# Patient Record
Sex: FEMALE | Race: WHITE
[De-identification: names, ages, dates, MRNs, and addresses within clinical notes are randomized per-mention and may not be internally consistent; named-entity substitution may affect disease eponyms.]

---

## 2017-11-28 ENCOUNTER — HOSPITAL ENCOUNTER (OUTPATIENT)
Dept: HOSPITAL 10 - OBT | Age: 32
Discharge: HOME | End: 2017-11-28
Attending: OBSTETRICS & GYNECOLOGY
Payer: MEDICAID

## 2017-11-28 VITALS
HEIGHT: 60 IN | BODY MASS INDEX: 31.81 KG/M2 | WEIGHT: 162.04 LBS | HEIGHT: 60 IN | WEIGHT: 162.04 LBS | BODY MASS INDEX: 31.81 KG/M2

## 2017-11-28 VITALS — HEART RATE: 91 BPM | RESPIRATION RATE: 19 BRPM | DIASTOLIC BLOOD PRESSURE: 77 MMHG | SYSTOLIC BLOOD PRESSURE: 118 MMHG

## 2017-11-28 DIAGNOSIS — Z3A.37: ICD-10-CM

## 2017-11-28 PROCEDURE — 76818 FETAL BIOPHYS PROFILE W/NST: CPT

## 2017-11-28 PROCEDURE — G0463 HOSPITAL OUTPT CLINIC VISIT: HCPCS

## 2017-11-28 NOTE — TRIAGE
===================================

OB Triage

===================================

Datetime Report Generated by CPN: 11/28/2017 15:28

   

   

===========================

Datetime: 11/28/2017 15:17

===========================

   

 Stage of Pregnancy:  OB Triage

   

===================================

Maternal Assessment

===================================

   

 Level of Consciousness:  Fully Conscious

 DTR's/Clonus:  DTRs 1+

 Headache:  Denies

 Breath Sounds, Left:  Clear and Equal

 Breath Sounds, Right:  Clear and Equal

 Nausea/Vomiting:  Denies

 RUQ Epigastric Pain:  Denies

 Monitor Mode:  External

 Resting Tone [place]:  Relaxed

   

===================================

Fetal Heart Rate

===================================

   

 FHR Baseline Rate:  135

 Monitor Mode:  External US

 Variability:  Moderate 6-25 bpm

 Accelerations:  15X15

 Decelerations:  None

 Category:  Category I

   

===================================

Pain Assessment

===================================

   

 Pain Scale:  0

 Pain Presence:  None/Denies

 Pain Type:  N/A

 Pain Goal:  3

   

===================================

Vaginal Exam

===================================

   

 Membrane Status:  Intact

   

===========================

Datetime: 11/28/2017 15:00

===========================

   

 Stage of Pregnancy:  OB Triage

   

===================================

Maternal Assessment

===================================

   

 Level of Consciousness:  Fully Conscious

 DTR's/Clonus:  DTRs 1+

 Headache:  Denies

 Breath Sounds, Left:  Clear and Equal

 Breath Sounds, Right:  Clear and Equal

 Nausea/Vomiting:  Denies

 RUQ Epigastric Pain:  Denies

   

===================================

Labor Evaluation

===================================

   

 Frequency:  X1

 Monitor Mode:  External

 Duration (sec)2399:  60

      

 Quality:  Mild

 Resting Tone [place]:  Relaxed

   

===================================

Fetal Heart Rate

===================================

   

 FHR Baseline Rate:  135

 Monitor Mode:  External US

 Variability:  Moderate 6-25 bpm

 Accelerations:  15X15

 Decelerations:  None

 Category:  Category I

   

===================================

Pain Assessment

===================================

   

 Pain Scale:  0

 Pain Presence:  None/Denies

 Pain Type:  N/A

 Pain Goal:  3

   

===================================

Vaginal Exam

===================================

   

 Membrane Status:  Intact

   

===========================

Datetime: 11/28/2017 14:10

===========================

   

 Stage of Pregnancy:  OB Triage

   

===================================

Maternal Assessment

===================================

   

 Level of Consciousness:  Fully Conscious

 DTR's/Clonus:  DTRs 1+

 Headache:  Denies

 Breath Sounds, Left:  Clear and Equal

 Breath Sounds, Right:  Clear and Equal

 Nausea/Vomiting:  Denies

 RUQ Epigastric Pain:  Denies

   

===================================

Labor Evaluation

===================================

   

 Frequency:  NONE

 Monitor Mode:  External

 Resting Tone [place]:  Relaxed

   

===================================

Fetal Heart Rate

===================================

   

 FHR Baseline Rate:  140

 Monitor Mode:  External US

 Variability:  Moderate 6-25 bpm

 Accelerations:  10X10

 Decelerations:  None

 Category:  Category II

   

===================================

Pain Assessment

===================================

   

 Pain Scale:  0

 Pain Presence:  None/Denies

 Pain Type:  N/A

 Pain Goal:  3

   

===================================

Vaginal Exam

===================================

   

 Membrane Status:  Intact

   

===========================

Datetime: 11/28/2017 13:35

===========================

   

   

===================================

Maternal Assessment

===================================

   

 Level of Consciousness:  Fully Conscious

 DTR's/Clonus:  DTRs 1+

 Headache:  Denies

 Blurred Vision:  No

 Respiratory Effort:  Unlabored

 Breath Sounds, Left:  Clear and Equal

 Breath Sounds, Right:  Clear and Equal

 Nausea/Vomiting:  Denies

 RUQ Epigastric Pain:  Denies

 Facial Edema:  None

   

===================================

Labor Evaluation

===================================

   

 Frequency:  NONE

 Monitor Mode:  External

 Resting Tone [place]:  Relaxed

   

===================================

Fetal Heart Rate

===================================

   

 FHR Baseline Rate:  140

 Monitor Mode:  External US

 Variability:  Moderate 6-25 bpm

 Accelerations:  10X10

 Decelerations:  Variable

 Category:  Category I

   

===================================

Pain Assessment

===================================

   

 Pain Scale:  0

 Pain Presence:  None/Denies

 Pain Type:  N/A

 Pain Goal:  3

   

===================================

Vaginal Exam

===================================

   

 Membrane Status:  Intact

   

===========================

Datetime: 11/28/2017 13:10

===========================

   

 Assessment Type:  Triage

   

===================================

Maternal Assessment

===================================

   

 Level of Consciousness:  Fully Conscious

 DTR's/Clonus:  DTRs 2+; No Clonus

 Headache:  Denies

 Blurred Vision:  No

 Respiratory Effort:  Unlabored; Regular Rhythm; Equal Expansion

 Breath Sounds, Left:  Clear and Equal

 Breath Sounds, Right:  Clear and Equal

 Nausea/Vomiting:  Denies

 RUQ Epigastric Pain:  Denies

 Lower Extremities Edema:  None

     Degree:  None

 Upper Extremities Edema:  None

     Degree:  None

 Facial Edema:  None

   

===================================

Fall Risk Assessment

===================================

   

 History of Falling:  (0) No

 Secondary Diagnosis:  (0) No

 Ambulatory Aid:  (0) Bedrest/Nurse Assist

 IV Therapy:  (0) No

 Gait:  (0) Normal/Bedrest/Immobile

 Mental Status:  (0) Oriented to Own Ability

 Fall Score:  0

 Fall Risk Score Definition:  No Risk: No action required

   

===========================

Datetime: 11/28/2017 13:07

===========================

   

 EGA:  37.4

   

===========================

Datetime: 11/28/2017 12:49

===========================

   

 Time of Arrival:  11/28/2017 12:49

 Arrived By:  Ambulatory

 Arrived From:  Home

 Chief Complaint:  PT CAME IN C/O UC'S SINCE YESTERDAY Q 10 MIN

 Fetal Movement:  Present

 Contractions:  Denies/Absent

 Rupture of Membranes:  Denies

 Vaginal Discharge:  Denies

 Recent Sexual Intercouse:  Denies

 Abdominal Trauma:  Not Applicable

 Additional Patient Complaints:  NONE

 Time Provider Notified:  11/28/2017 13:00

 Provider Notified:  RONIT

 Initial Plan:  NST AND BPP AND VE

## 2017-11-28 NOTE — PN
Triage Information


Date/Time





Reason for visit:  Uterine contractions


Weeks of Gestation


37+


/Para


3/1


Diabetes:  none


Hypertention:  none





Objective





 Vital Signs








  Date Time  Temp Pulse Resp B/P Pulse Ox O2 Delivery O2 Flow Rate FiO2


 


17 13:27 98.6 91 19 118/77 99 Room Air  








Fetal Heart Rate:  140's


Contractions:  None


Disposition:  Discharge


Assessment/Plan


Discharged with precautions


F/u with primary JANUSZ FINCH M.D. 2017 15:02

## 2017-11-28 NOTE — RADRPT
PROCEDURE:   US OB biophysical profile. 

 

CLINICAL INDICATION:    decreased fetal movements 

 

TECHNIQUE:   Multiple sonographic images of the pelvis were obtained.  The images were reviewed on a
 PACS workstation. 

 

COMPARISON:   No prior studies are available for comparison. 

 

FINDINGS:

 

There is a single viable intrauterine gestation.  Cardiac activity is present with 156 beats per min
[place]. 

There is a vertex presentation. 

The placenta is fundal left.   There is no evidence of placental abruption.

There is a normal amount of amniotic fluid with an DEBORA = 10.4 cm.

 

Biophysical profile:

Fetal movement 2/2

Fetal tone 2/2.

Fetal breathing 2/2 

DEBORA 2/2

 

Total 8/8 

 

RPTAT: AA . 

 

IMPRESSION:

Normal biophysical profile.  

 .

_____________________________________________ 

.Mario Alberto Don MD, MD           Date    Time 

Electronically viewed and signed by .Mario Alberto Don MD, MD on 11/28/2017 13:50 

 

D:  11/28/2017 13:50  T:  11/28/2017 13:50

.S/

## 2017-11-30 ENCOUNTER — HOSPITAL ENCOUNTER (OUTPATIENT)
Dept: HOSPITAL 10 - OBT | Age: 32
Discharge: HOME | End: 2017-11-30
Attending: OBSTETRICS & GYNECOLOGY
Payer: MEDICAID

## 2017-11-30 VITALS — HEART RATE: 89 BPM | SYSTOLIC BLOOD PRESSURE: 123 MMHG | DIASTOLIC BLOOD PRESSURE: 72 MMHG | RESPIRATION RATE: 18 BRPM

## 2017-11-30 VITALS
WEIGHT: 163.14 LBS | BODY MASS INDEX: 32.03 KG/M2 | HEIGHT: 60 IN | WEIGHT: 163.14 LBS | BODY MASS INDEX: 32.03 KG/M2 | HEIGHT: 60 IN

## 2017-11-30 DIAGNOSIS — O36.8130: Primary | ICD-10-CM

## 2017-11-30 DIAGNOSIS — Z3A.37: ICD-10-CM

## 2017-11-30 PROCEDURE — 76818 FETAL BIOPHYS PROFILE W/NST: CPT

## 2017-11-30 PROCEDURE — G0463 HOSPITAL OUTPT CLINIC VISIT: HCPCS

## 2017-11-30 NOTE — TRIAGE
===================================

OB Triage

===================================

Datetime Report Generated by CPN: 11/30/2017 18:19

   

   

===========================

Datetime: 11/30/2017 17:38

===========================

   

   

===================================

Labor Evaluation

===================================

   

 Frequency:  0

 Monitor Mode:  External

 Pattern:  Normal: <= 5 Contractions in 10 Minutes

 Resting Tone [place]:  Relaxed

   

===================================

Fetal Heart Rate

===================================

   

 FHR Baseline Rate:  140

 Monitor Mode:  External US

 Variability:  Moderate 6-25 bpm

 Accelerations:  15X15

 Decelerations:  None

 Category:  Category I

   

===================================

Pain Assessment

===================================

   

 Pain Presence:  None/Denies

   

===========================

Datetime: 11/30/2017 15:49

===========================

   

   

===================================

Labor Evaluation

===================================

   

 Frequency:  0

 Monitor Mode:  External

 Pattern:  Normal: <= 5 Contractions in 10 Minutes

 Resting Tone [place]:  Relaxed

   

===================================

Fetal Heart Rate

===================================

   

 FHR Baseline Rate:  140

 Monitor Mode:  External US

 Variability:  Moderate 6-25 bpm

 Accelerations:  15X15

 Decelerations:  None

 Category:  Category I

 Comments:  NST REACTIVE

   

===================================

Pain Assessment

===================================

   

 Pain Presence:  None/Denies

   

===========================

Datetime: 11/30/2017 15:17

===========================

   

 Assessment Type:  Admission Assessment

   

===================================

Maternal Assessment

===================================

   

 Level of Consciousness:  Fully Conscious

 DTR's/Clonus:  DTRs 2+; No Clonus

 Headache:  Denies

 Blurred Vision:  No

 Respiratory Effort:  Unlabored; Regular Rhythm; Equal Expansion

 Breath Sounds, Left:  Clear and Equal

 Breath Sounds, Right:  Clear and Equal

 Nausea/Vomiting:  Denies

 RUQ Epigastric Pain:  Denies

 Lower Extremities Edema:  None

     Degree:  None

 Upper Extremities Edema:  None

     Degree:  None

 Facial Edema:  None

   

===================================

Fall Risk Assessment

===================================

   

 History of Falling:  (0) No

 Secondary Diagnosis:  (0) No

 Ambulatory Aid:  (0) Bedrest/Nurse Assist

 IV Therapy:  (0) No

 Gait:  (0) Normal/Bedrest/Immobile

 Mental Status:  (0) Oriented to Own Ability

 Fall Score:  0

 Fall Risk Score Definition:  No Risk: No action required

   

===========================

Datetime: 11/30/2017 15:16

===========================

   

 Time of Arrival:  11/30/2017 14:25

 EGA:  37.6

 Arrived By:  Ambulatory

 Arrived From:  Home

 Chief Complaint:  FOLLOW UP NST/ BPP FOR DECREASED FETAL MOVEMENT

 Fetal Movement:  Decreased

 Contractions:  Denies/Absent

 Time Contractions Began:  11/30/2017 16:10

 Rupture of Membranes:  Denies

 Vaginal Bleeding:  None

 Vaginal Discharge:  Denies

 Recent Sexual Intercouse:  Denies

 Abdominal Trauma:  Not Applicable

 Patient Complaints:  None

 Time Provider Notified:  11/30/2017 16:20

 Provider Notified:  [facility]

 Initial Plan:  TOCO/ US. NST- REACTIVE/ BPP-8/8, DEBORA 14.0

   

===========================

Datetime: 11/30/2017 14:58

===========================

   

   

===================================

Pain Assessment

===================================

   

 Pain Presence:  None/Denies

   

===========================

Datetime: 11/28/2017 13:10

===========================

   

 Fall Score:  0

 Fall Risk Score Definition:  No Risk: No action required

   

===========================

Datetime: 11/28/2017 13:07

===========================

   

 EGA:  37.4

## 2017-11-30 NOTE — CONS
Date/Time of Note


Date/Time of Note


DATE: 17 


TIME: 17:41





Consultation Date/Type/Reason


Admit Date/Time


 2017


OB triage consult.


This patient is a 32 years old  3 para 1 spontaneous  1 living 1

, with estimated date of confinement of December 15, 2017 which makes her 37 

weeks and 6 days today.


She was referred to the triage due to low fetal movement.


On examination she is a close to term pregnancy  patient in no acute 

distress her vital signs appears to be normal with blood pressure 123/72, pulse 

rate of 89 respiration 18,, temperature 98.6 sevenths.


On examination abdomen is soft she has occasional contraction.


Fetal heart tracing is reactive with good variability and occasional 

acceleration no decelerations.


Constitutional:  No chills, No diaphoresis, No disoriented, No febrile, No 

improved, No no complaints, No other, No poor po, No requiring IVF, No 

requiring O2


ENT:  No bleeding, No congestion, No discharge, No dysphagia, No no complaints, 

No other, No pain, No sore throat


Respiratory:  No cough, No no complaints, No other, No pain, No pleuritic pain, 

No shortness of breath, No sputum, No wheezing


Cardiovascular:  No chest pain, No edema, No lightheadedness, No no complaints, 

No orthopenea, No other, No palpitations, No paroxysmal nocturnal dyspnea


Gastrointestinal:  No blood, No constipation, No decreased appetite, No diarrhea

, No flatus, No nausea, No no complaints, No other, No pain, No passing stool, 

No vomiting


Genitourinary:  No bleeding, No discharge, No dysuria, No flank pain, No 

hematuria, No no complaints, No other


Musculoskeletal:  No back pain, No bone/joint pain, No neck pain, No no 

complaints, No other, No restricted range of motion, No swelling


Skin:  No bruising, No erythema, No laceration, No no complaints, No other, No 

pruritis, No rash, No skin lesions


Neurologic:  No confusion, No dizziness, No focal-weakness, No headache, No no 

complaints, No other, No seizure, No syncope


Endocrine:  No dry skin, No no complaints, No other, No polydypsia, No polyuria

, No temp intolerance


Additional Comments


Due to lack of  contractions pelvic exam was not performed.


On ultrasound study report was a single viable gestation in cephalic 

presentation no evidence of placenta previa amniotic fluid index was reported 

at 14.0 cm


Her biophysical profile was 8/8.


;.


Disposition with these normal finding patient was discharged to be returned to 

her obstetrician's clinic for follow-up of care kick count was advised





Social History


Smoking Status:  Never smoker





Exam/Review of Systems


Vital Signs


Vitals





 Vital Signs








  Date Time  Temp Pulse Resp B/P Pulse Ox O2 Delivery O2 Flow Rate FiO2


 


17 15:06 98.6 89 18 123/72  Room Air  

















THALIA MANUEL MD 2017 17:47

## 2017-11-30 NOTE — RADRPT
PROCEDURE:   Obstetrical ultrasound for biophysical profile 

 

CLINICAL INDICATION:  Biophysical profile.  Pregnancy.  

 

TECHNIQUE:   Obstetrical ultrasound of the pregnant uterus for biophysical profile.  Transabdominal 
views are obtained.

 

COMPARISON:   US PELVIS 11/28/2017

 

FINDINGS:

Single intrauterine gestation.

 

Presentation: Cephalic.

 

Placenta: Fundal 

No evidence of placental abruption.

No evidence of placenta previa.

 

Fetal breathing movement = 2/2

Fetal tone = 2/2

Fetal motion = 2/2

DEBORA = 2/2

 

DE[place] = 14.0 cm

Fetal heart rate: 142 beats per minute

 

 

IMPRESSION:

 

Single intrauterine gestation.

Biophysical profile  8/8  

 

 

RPTAT: AADD

_____________________________________________ 

.Isaac Meza MD, MD           Date    Time 

Electronically viewed and signed by .Isaac Meza MD, MD on 11/30/2017 16:17 

 

D:  11/30/2017 16:17  T:  11/30/2017 16:17

.B/

## 2017-12-20 ENCOUNTER — HOSPITAL ENCOUNTER (INPATIENT)
Dept: HOSPITAL 91 - OBT | Age: 32
LOS: 14 days | Discharge: HOME HEALTH | End: 2018-01-03
Payer: MEDICAID

## 2017-12-20 ENCOUNTER — HOSPITAL ENCOUNTER (INPATIENT)
Age: 32
LOS: 14 days | Discharge: HOME HEALTH | End: 2018-01-03

## 2017-12-20 DIAGNOSIS — N99.89: ICD-10-CM

## 2017-12-20 DIAGNOSIS — O36.8330: ICD-10-CM

## 2017-12-20 DIAGNOSIS — D64.9: ICD-10-CM

## 2017-12-20 DIAGNOSIS — R73.9: ICD-10-CM

## 2017-12-20 DIAGNOSIS — B96.20: ICD-10-CM

## 2017-12-20 DIAGNOSIS — J18.9: ICD-10-CM

## 2017-12-20 DIAGNOSIS — L02.211: ICD-10-CM

## 2017-12-20 DIAGNOSIS — O34.43: ICD-10-CM

## 2017-12-20 DIAGNOSIS — Z3A.40: ICD-10-CM

## 2017-12-20 DIAGNOSIS — N88.8: ICD-10-CM

## 2017-12-20 DIAGNOSIS — J96.01: ICD-10-CM

## 2017-12-20 DIAGNOSIS — R65.20: ICD-10-CM

## 2017-12-20 DIAGNOSIS — E66.9: ICD-10-CM

## 2017-12-20 LAB
ADD MAN DIFF?: NO
BASOPHIL #: 0.1 10^3/UL (ref 0–0.1)
BASOPHILS %: 0.6 % (ref 0–2)
EOSINOPHILS #: 0.8 10^3/UL (ref 0–0.5)
EOSINOPHILS %: 7 % (ref 0–7)
HEMATOCRIT: 37.1 % (ref 37–47)
HEMOGLOBIN: 12.5 G/DL (ref 12–16)
HEPATITIS B SURFACE ANTIGEN: NEGATIVE
INR: 0.94
LYMPHOCYTES #: 1.7 10^3/UL (ref 0.8–2.9)
LYMPHOCYTES %: 15.4 % (ref 15–51)
MEAN CORPUSCULAR HEMOGLOBIN: 30.6 PG (ref 29–33)
MEAN CORPUSCULAR HGB CONC: 33.7 G/DL (ref 32–37)
MEAN CORPUSCULAR VOLUME: 90.7 FL (ref 82–101)
MEAN PLATELET VOLUME: 10.7 FL (ref 7.4–10.4)
MONOCYTE #: 0.8 10^3/UL (ref 0.3–0.9)
MONOCYTES %: 7.2 % (ref 0–11)
NEUTROPHIL #: 7.5 10^3/UL (ref 1.6–7.5)
NEUTROPHILS %: 69.2 % (ref 39–77)
NUCLEATED RED BLOOD CELLS #: 0 10^3/UL (ref 0–0)
NUCLEATED RED BLOOD CELLS%: 0 /100WBC (ref 0–0)
PARTIAL THROMBOPLASTIN TIME: 25.9 SEC (ref 25–35)
PLATELET COUNT: 241 10^3/UL (ref 140–415)
PROTIME: 12.7 SEC (ref 11.9–14.9)
PT RATIO: 1
RAPID PLASMA REAGIN: NONREACTIVE
RED BLOOD COUNT: 4.09 10^6/UL (ref 4.2–5.4)
RED CELL DISTRIBUTION WIDTH: 13.7 % (ref 11.5–14.5)
WHITE BLOOD COUNT: 10.8 10^3/UL (ref 4.8–10.8)

## 2017-12-20 PROCEDURE — 93306 TTE W/DOPPLER COMPLETE: CPT

## 2017-12-20 PROCEDURE — 83036 HEMOGLOBIN GLYCOSYLATED A1C: CPT

## 2017-12-20 PROCEDURE — 85610 PROTHROMBIN TIME: CPT

## 2017-12-20 PROCEDURE — 84439 ASSAY OF FREE THYROXINE: CPT

## 2017-12-20 PROCEDURE — 83735 ASSAY OF MAGNESIUM: CPT

## 2017-12-20 PROCEDURE — 90715 TDAP VACCINE 7 YRS/> IM: CPT

## 2017-12-20 PROCEDURE — 84443 ASSAY THYROID STIM HORMONE: CPT

## 2017-12-20 PROCEDURE — 71260 CT THORAX DX C+: CPT

## 2017-12-20 PROCEDURE — 84100 ASSAY OF PHOSPHORUS: CPT

## 2017-12-20 PROCEDURE — 3E0P7VZ INTRODUCTION OF HORMONE INTO FEMALE REPRODUCTIVE, VIA NATURAL OR ARTIFICIAL OPENING: ICD-10-PCS

## 2017-12-20 PROCEDURE — 87070 CULTURE OTHR SPECIMN AEROBIC: CPT

## 2017-12-20 PROCEDURE — 84484 ASSAY OF TROPONIN QUANT: CPT

## 2017-12-20 PROCEDURE — 87340 HEPATITIS B SURFACE AG IA: CPT

## 2017-12-20 PROCEDURE — 71010: CPT

## 2017-12-20 PROCEDURE — 71275 CT ANGIOGRAPHY CHEST: CPT

## 2017-12-20 PROCEDURE — 86850 RBC ANTIBODY SCREEN: CPT

## 2017-12-20 PROCEDURE — 62319: CPT

## 2017-12-20 PROCEDURE — 72192 CT PELVIS W/O DYE: CPT

## 2017-12-20 PROCEDURE — 71020: CPT

## 2017-12-20 PROCEDURE — 94760 N-INVAS EAR/PLS OXIMETRY 1: CPT

## 2017-12-20 PROCEDURE — 80202 ASSAY OF VANCOMYCIN: CPT

## 2017-12-20 PROCEDURE — 83605 ASSAY OF LACTIC ACID: CPT

## 2017-12-20 PROCEDURE — 82962 GLUCOSE BLOOD TEST: CPT

## 2017-12-20 PROCEDURE — 86901 BLOOD TYPING SEROLOGIC RH(D): CPT

## 2017-12-20 PROCEDURE — 36600 WITHDRAWAL OF ARTERIAL BLOOD: CPT

## 2017-12-20 PROCEDURE — 86592 SYPHILIS TEST NON-TREP QUAL: CPT

## 2017-12-20 PROCEDURE — 86900 BLOOD TYPING SEROLOGIC ABO: CPT

## 2017-12-20 PROCEDURE — 94664 DEMO&/EVAL PT USE INHALER: CPT

## 2017-12-20 PROCEDURE — 80048 BASIC METABOLIC PNL TOTAL CA: CPT

## 2017-12-20 PROCEDURE — 87040 BLOOD CULTURE FOR BACTERIA: CPT

## 2017-12-20 PROCEDURE — 87086 URINE CULTURE/COLONY COUNT: CPT

## 2017-12-20 PROCEDURE — 74176 CT ABD & PELVIS W/O CONTRAST: CPT

## 2017-12-20 PROCEDURE — 80061 LIPID PANEL: CPT

## 2017-12-20 PROCEDURE — 93005 ELECTROCARDIOGRAM TRACING: CPT

## 2017-12-20 PROCEDURE — 85730 THROMBOPLASTIN TIME PARTIAL: CPT

## 2017-12-20 PROCEDURE — 85025 COMPLETE CBC W/AUTO DIFF WBC: CPT

## 2017-12-20 PROCEDURE — 82803 BLOOD GASES ANY COMBINATION: CPT

## 2017-12-20 PROCEDURE — 80053 COMPREHEN METABOLIC PANEL: CPT

## 2017-12-20 PROCEDURE — 93970 EXTREMITY STUDY: CPT

## 2017-12-20 PROCEDURE — 76818 FETAL BIOPHYS PROFILE W/NST: CPT

## 2017-12-20 RX ADMIN — DINOPROSTONE 1 MG: 10 INSERT VAGINAL at 11:08

## 2017-12-20 RX ADMIN — AMPICILLIN 1 MLS/HR: 1 INJECTION, POWDER, FOR SOLUTION INTRAMUSCULAR; INTRAVENOUS at 18:26

## 2017-12-20 RX ADMIN — AMPICILLIN 1 MLS/HR: 2 INJECTION, POWDER, FOR SOLUTION INTRAVENOUS at 10:56

## 2017-12-20 RX ADMIN — PYRIDOXINE HYDROCHLORIDE 1 MLS/HR: 100 INJECTION, SOLUTION INTRAMUSCULAR; INTRAVENOUS at 22:23

## 2017-12-20 RX ADMIN — AMPICILLIN 1 MLS/HR: 1 INJECTION, POWDER, FOR SOLUTION INTRAMUSCULAR; INTRAVENOUS at 22:23

## 2017-12-20 RX ADMIN — PYRIDOXINE HYDROCHLORIDE 1 MLS/HR: 100 INJECTION, SOLUTION INTRAMUSCULAR; INTRAVENOUS at 10:46

## 2017-12-20 RX ADMIN — AMPICILLIN 1 MLS/HR: 1 INJECTION, POWDER, FOR SOLUTION INTRAMUSCULAR; INTRAVENOUS at 15:00

## 2017-12-21 RX ADMIN — PYRIDOXINE HYDROCHLORIDE 1 MLS/HR: 100 INJECTION, SOLUTION INTRAMUSCULAR; INTRAVENOUS at 15:38

## 2017-12-21 RX ADMIN — AMPICILLIN 1 MLS/HR: 1 INJECTION, POWDER, FOR SOLUTION INTRAMUSCULAR; INTRAVENOUS at 10:31

## 2017-12-21 RX ADMIN — Medication 1 MLS/HR: at 20:22

## 2017-12-21 RX ADMIN — FENTANYL CIT 0.2 MG/100ML-ROPIV 0.2%-NACL 0.9% EPIDURAL INJ 1 ML: 2/0.2 SOLUTION at 19:49

## 2017-12-21 RX ADMIN — AMPICILLIN 1 MLS/HR: 1 INJECTION, POWDER, FOR SOLUTION INTRAMUSCULAR; INTRAVENOUS at 14:39

## 2017-12-21 RX ADMIN — AMPICILLIN 1 MLS/HR: 1 INJECTION, POWDER, FOR SOLUTION INTRAMUSCULAR; INTRAVENOUS at 19:10

## 2017-12-21 RX ADMIN — PYRIDOXINE HYDROCHLORIDE 1 MLS/HR: 100 INJECTION, SOLUTION INTRAMUSCULAR; INTRAVENOUS at 12:36

## 2017-12-21 RX ADMIN — Medication 1 MLS/HR: at 20:21

## 2017-12-21 RX ADMIN — PYRIDOXINE HYDROCHLORIDE 1 MLS/HR: 100 INJECTION, SOLUTION INTRAMUSCULAR; INTRAVENOUS at 11:45

## 2017-12-21 RX ADMIN — PYRIDOXINE HYDROCHLORIDE 1 MLS/HR: 100 INJECTION, SOLUTION INTRAMUSCULAR; INTRAVENOUS at 07:53

## 2017-12-21 RX ADMIN — AMPICILLIN 1 MLS/HR: 1 INJECTION, POWDER, FOR SOLUTION INTRAMUSCULAR; INTRAVENOUS at 02:51

## 2017-12-21 RX ADMIN — PYRIDOXINE HYDROCHLORIDE 1 MLS/HR: 100 INJECTION, SOLUTION INTRAMUSCULAR; INTRAVENOUS at 22:30

## 2017-12-21 RX ADMIN — CEFAZOLIN SODIUM 1 MLS/HR: 2 SOLUTION INTRAVENOUS at 18:48

## 2017-12-21 RX ADMIN — PYRIDOXINE HYDROCHLORIDE 1 MLS/HR: 100 INJECTION, SOLUTION INTRAMUSCULAR; INTRAVENOUS at 22:42

## 2017-12-21 RX ADMIN — Medication 1 MLS/HR: at 03:48

## 2017-12-21 RX ADMIN — AMPICILLIN 1 MLS/HR: 1 INJECTION, POWDER, FOR SOLUTION INTRAMUSCULAR; INTRAVENOUS at 06:29

## 2017-12-22 LAB
ADD MAN DIFF?: NO
BASOPHIL #: 0.1 10^3/UL (ref 0–0.1)
BASOPHILS %: 0.3 % (ref 0–2)
EOSINOPHILS #: 0.1 10^3/UL (ref 0–0.5)
EOSINOPHILS %: 0.6 % (ref 0–7)
HEMATOCRIT: 26.4 % (ref 37–47)
HEMOGLOBIN: 9 G/DL (ref 12–16)
LYMPHOCYTES #: 1.3 10^3/UL (ref 0.8–2.9)
LYMPHOCYTES %: 8.9 % (ref 15–51)
MEAN CORPUSCULAR HEMOGLOBIN: 30.9 PG (ref 29–33)
MEAN CORPUSCULAR HGB CONC: 34.1 G/DL (ref 32–37)
MEAN CORPUSCULAR VOLUME: 90.7 FL (ref 82–101)
MEAN PLATELET VOLUME: 10.3 FL (ref 7.4–10.4)
MONOCYTE #: 0.9 10^3/UL (ref 0.3–0.9)
MONOCYTES %: 6.1 % (ref 0–11)
NEUTROPHIL #: 12.4 10^3/UL (ref 1.6–7.5)
NEUTROPHILS %: 83.8 % (ref 39–77)
NUCLEATED RED BLOOD CELLS #: 0 10^3/UL (ref 0–0)
NUCLEATED RED BLOOD CELLS%: 0 /100WBC (ref 0–0)
PLATELET COUNT: 183 10^3/UL (ref 140–415)
RED BLOOD COUNT: 2.91 10^6/UL (ref 4.2–5.4)
RED CELL DISTRIBUTION WIDTH: 13.9 % (ref 11.5–14.5)
WHITE BLOOD COUNT: 14.8 10^3/UL (ref 4.8–10.8)

## 2017-12-22 RX ADMIN — Medication 7 APPLIC: at 00:23

## 2017-12-22 RX ADMIN — PYRIDOXINE HYDROCHLORIDE 1 MLS/HR: 100 INJECTION, SOLUTION INTRAMUSCULAR; INTRAVENOUS at 11:11

## 2017-12-22 RX ADMIN — CEFAZOLIN SODIUM 1 MLS/HR: 2 SOLUTION INTRAVENOUS at 22:04

## 2017-12-22 RX ADMIN — OXYCODONE HYDROCHLORIDE AND ACETAMINOPHEN 1 TAB: 5; 325 TABLET ORAL at 23:02

## 2017-12-22 RX ADMIN — OXYCODONE HYDROCHLORIDE AND ACETAMINOPHEN 1 TAB: 5; 325 TABLET ORAL at 17:35

## 2017-12-22 RX ADMIN — PYRIDOXINE HYDROCHLORIDE 1 MLS/HR: 100 INJECTION, SOLUTION INTRAMUSCULAR; INTRAVENOUS at 04:21

## 2017-12-22 RX ADMIN — DIPHENHYDRAMINE HYDROCHLORIDE 1 MG: 50 INJECTION, SOLUTION INTRAMUSCULAR; INTRAVENOUS at 00:31

## 2017-12-22 RX ADMIN — Medication 1 MLS/HR: at 00:25

## 2017-12-22 RX ADMIN — KETOROLAC TROMETHAMINE 1 MG: 30 INJECTION, SOLUTION INTRAMUSCULAR at 10:10

## 2017-12-23 LAB
ADD MAN DIFF?: YES
ANISOCYTOSIS: (no result) (ref 0–0)
BAND NEUTROPHILS #M: 5.2 10^3/UL (ref 0–0.6)
BAND NEUTROPHILS % (M): 28 % (ref 0–4)
HEMATOCRIT: 27.8 % (ref 37–47)
HEMOGLOBIN: 9.3 G/DL (ref 12–16)
LYMPHOCYTES #M: 0.1 10^3/UL (ref 0.8–2.9)
LYMPHOCYTES % (M): 1 % (ref 15–51)
MEAN CORPUSCULAR HEMOGLOBIN: 31.1 PG (ref 29–33)
MEAN CORPUSCULAR HGB CONC: 33.5 G/DL (ref 32–37)
MEAN CORPUSCULAR VOLUME: 93 FL (ref 82–101)
MEAN PLATELET VOLUME: 9.8 FL (ref 7.4–10.4)
MICROCYTOSIS: (no result) (ref 0–0)
MONOCYTE #M: 0.5 10^3/UL (ref 0.3–0.9)
MONOCYTES % (M): 3 % (ref 0–11)
NUCLEATED RED BLOOD CELLS%: 0 /100WBC (ref 0–0)
PLATELET COUNT: 193 10^3/UL (ref 140–415)
PLATELET ESTIMATE: NORMAL
POSITIVE DIFF: (no result)
REACTIVE LYMPHOCYTES #M: 0.5 10^3/UL (ref 0–0)
REACTIVE LYMPHOCYTES% (M): 3 % (ref 0–0)
RED BLOOD COUNT: 2.99 10^6/UL (ref 4.2–5.4)
RED CELL DISTRIBUTION WIDTH: 14.3 % (ref 11.5–14.5)
SEG NEUT #M: 13.1 10^3/UL (ref 1.7–7.5)
SEGMENTED NEUTROPHILS (M) %: 65 % (ref 39–77)
SMUDGE%M: 1 % (ref 0–0)
WHITE BLOOD COUNT: 18.7 10^3/UL (ref 4.8–10.8)

## 2017-12-23 RX ADMIN — PIPERACILLIN AND TAZOBACTAM 1 MLS/HR: 3; .375 INJECTION, POWDER, LYOPHILIZED, FOR SOLUTION INTRAVENOUS; PARENTERAL at 18:00

## 2017-12-23 RX ADMIN — SODIUM PHOSPHATE, DIBASIC AND SODIUM PHOSPHATE, MONOBASIC 1 ML: 7; 19 ENEMA RECTAL at 17:22

## 2017-12-23 RX ADMIN — IBUPROFEN 1 MG: 600 TABLET ORAL at 11:23

## 2017-12-23 RX ADMIN — OXYCODONE HYDROCHLORIDE AND ACETAMINOPHEN 1 TAB: 5; 325 TABLET ORAL at 03:20

## 2017-12-23 RX ADMIN — CEFAZOLIN SODIUM 1 MLS/HR: 2 SOLUTION INTRAVENOUS at 05:27

## 2017-12-23 RX ADMIN — OXYCODONE HYDROCHLORIDE AND ACETAMINOPHEN 1 TAB: 5; 325 TABLET ORAL at 11:23

## 2017-12-23 RX ADMIN — OXYCODONE HYDROCHLORIDE AND ACETAMINOPHEN 1 TAB: 5; 325 TABLET ORAL at 20:32

## 2017-12-23 RX ADMIN — OXYCODONE HYDROCHLORIDE AND ACETAMINOPHEN 1 TAB: 5; 325 TABLET ORAL at 07:19

## 2017-12-23 RX ADMIN — PIPERACILLIN AND TAZOBACTAM 1 MLS/HR: 3; .375 INJECTION, POWDER, LYOPHILIZED, FOR SOLUTION INTRAVENOUS; PARENTERAL at 16:18

## 2017-12-24 LAB
ABNORMAL IP MESSAGE: 1
ADD MAN DIFF?: YES
ALLEN TEST: (no result)
ANISOCYTOSIS: (no result) (ref 0–0)
ARTERIAL BASE EXCESS: 0.7 MMOL/L (ref -3–3)
ARTERIAL BLOOD GAS OXYGEN SAT: 98 MMHG (ref 95–98)
ARTERIAL COHB: 0.3 % (ref 0–3)
ARTERIAL FRACTION OF OXYHGB: 97.6 % (ref 93–99)
ARTERIAL HCO3: 23.8 MMOL/L (ref 22–26)
ARTERIAL METHB: 0.1 % (ref 0–1.5)
ARTERIAL PCO2: 32.3 MMHG (ref 35–45)
ARTERIAL TOTAL HEMGLOBIN: 9.4 G/DL (ref 12–18)
BAND NEUTROPHILS #M: 7.6 10^3/UL (ref 0–0.6)
BAND NEUTROPHILS % (M): 41 % (ref 0–4)
EOSINOPHILS % (M): 5 % (ref 0–7)
FIO2: 53 %
HEMATOCRIT: 27.1 % (ref 37–47)
HEMOGLOBIN A1C: 5.7 % (ref 0–5.9)
HEMOGLOBIN: 9.1 G/DL (ref 12–16)
LACTIC ACID: 1.7 MMOL/L (ref 0.5–2)
LACTIC ACID: 3 MMOL/L (ref 0.5–2)
LYMPHOCYTES #M: 0.1 10^3/UL (ref 0.8–2.9)
LYMPHOCYTES % (M): 1 % (ref 15–51)
MEAN CORPUSCULAR HEMOGLOBIN: 31.2 PG (ref 29–33)
MEAN CORPUSCULAR HGB CONC: 33.6 G/DL (ref 32–37)
MEAN CORPUSCULAR VOLUME: 92.8 FL (ref 82–101)
MEAN PLATELET VOLUME: 10 FL (ref 7.4–10.4)
MODE: (no result)
MONOCYTE #M: 0.3 10^3/UL (ref 0.3–0.9)
MONOCYTES % (M): 2 % (ref 0–11)
NUCLEATED RED BLOOD CELLS%: 0 /100WBC (ref 0–0)
O2 A-A PPRESDIFF RESPIRATORY: 227.8 MMHG (ref 7–24)
PLATELET COUNT: 215 10^3/UL (ref 140–415)
PLATELET ESTIMATE: NORMAL
POSITIVE DIFF: (no result)
RED BLOOD COUNT: 2.92 10^6/UL (ref 4.2–5.4)
RED CELL DISTRIBUTION WIDTH: 14.3 % (ref 11.5–14.5)
SEG NEUT #M: 11.1 10^3/UL (ref 1.7–7.5)
SEGMENTED NEUTROPHILS (M) %: 52 % (ref 39–77)
SMUDGE%M: 1 % (ref 0–0)
TROPONIN-I: < 0.012 NG/ML (ref 0–0.12)
WHITE BLOOD COUNT: 18.7 10^3/UL (ref 4.8–10.8)

## 2017-12-24 RX ADMIN — IBUPROFEN 1 MG: 600 TABLET ORAL at 17:46

## 2017-12-24 RX ADMIN — VANCOMYCIN HYDROCHLORIDE 1 MLS/HR: 1 INJECTION, POWDER, LYOPHILIZED, FOR SOLUTION INTRAVENOUS at 18:31

## 2017-12-24 RX ADMIN — INSULIN GLARGINE 1 UNIT: 100 INJECTION, SOLUTION SUBCUTANEOUS at 21:56

## 2017-12-24 RX ADMIN — PYRIDOXINE HYDROCHLORIDE 1 MLS/HR: 100 INJECTION, SOLUTION INTRAMUSCULAR; INTRAVENOUS at 13:12

## 2017-12-24 RX ADMIN — INSULIN ASPART 1 UNIT: 100 INJECTION, SOLUTION INTRAVENOUS; SUBCUTANEOUS at 18:24

## 2017-12-24 RX ADMIN — METHYLPREDNISOLONE SODIUM SUCCINATE 1 MG: 40 INJECTION, POWDER, FOR SOLUTION INTRAMUSCULAR; INTRAVENOUS at 18:21

## 2017-12-24 RX ADMIN — INSULIN ASPART 1 UNIT: 100 INJECTION, SOLUTION INTRAVENOUS; SUBCUTANEOUS at 17:55

## 2017-12-24 RX ADMIN — OXYCODONE HYDROCHLORIDE AND ACETAMINOPHEN 1 TAB: 5; 325 TABLET ORAL at 07:55

## 2017-12-24 RX ADMIN — IBUPROFEN 1 MG: 600 TABLET ORAL at 22:59

## 2017-12-24 RX ADMIN — PYRIDOXINE HYDROCHLORIDE 1 MLS/HR: 100 INJECTION, SOLUTION INTRAMUSCULAR; INTRAVENOUS at 20:30

## 2017-12-24 RX ADMIN — IBUPROFEN 1 MG: 600 TABLET ORAL at 05:49

## 2017-12-24 RX ADMIN — MORPHINE SULFATE 1 MG: 2 INJECTION, SOLUTION INTRAMUSCULAR; INTRAVENOUS at 14:43

## 2017-12-24 RX ADMIN — CEFEPIME HYDROCHLORIDE 1 MLS/HR: 2 INJECTION, POWDER, FOR SOLUTION INTRAVENOUS at 17:02

## 2017-12-24 RX ADMIN — PIPERACILLIN AND TAZOBACTAM 1 MLS/HR: 3; .375 INJECTION, POWDER, LYOPHILIZED, FOR SOLUTION INTRAVENOUS; PARENTERAL at 00:04

## 2017-12-24 RX ADMIN — PIPERACILLIN AND TAZOBACTAM 1 MLS/HR: 3; .375 INJECTION, POWDER, LYOPHILIZED, FOR SOLUTION INTRAVENOUS; PARENTERAL at 13:12

## 2017-12-24 RX ADMIN — PIPERACILLIN AND TAZOBACTAM 1 MLS/HR: 3; .375 INJECTION, POWDER, LYOPHILIZED, FOR SOLUTION INTRAVENOUS; PARENTERAL at 05:48

## 2017-12-24 RX ADMIN — INSULIN ASPART 1 UNIT: 100 INJECTION, SOLUTION INTRAVENOUS; SUBCUTANEOUS at 21:00

## 2017-12-24 RX ADMIN — IBUPROFEN 1 MG: 600 TABLET ORAL at 00:04

## 2017-12-24 RX ADMIN — ACETAMINOPHEN 1 MG: 325 TABLET, FILM COATED ORAL at 14:55

## 2017-12-25 LAB
ADD MAN DIFF?: YES
ANION GAP: 11 (ref 8–16)
BAND NEUTROPHILS #M: 3.9 10^3/UL (ref 0–0.6)
BAND NEUTROPHILS % (M): 19 % (ref 0–4)
BLOOD UREA NITROGEN: 9 MG/DL (ref 7–20)
CALCIUM: 8.5 MG/DL (ref 8.4–10.2)
CARBON DIOXIDE: 26 MMOL/L (ref 21–31)
CHLORIDE: 107 MMOL/L (ref 97–110)
CREATININE: 0.51 MG/DL (ref 0.44–1)
ERYTHROBLAST% (NRBC) (M): 1 % (ref 0–0)
GLUCOSE: 144 MG/DL (ref 70–220)
HEMATOCRIT: 25.5 % (ref 37–47)
HEMOGLOBIN: 8.6 G/DL (ref 12–16)
LYMPHOCYTES #M: 0.4 10^3/UL (ref 0.8–2.9)
LYMPHOCYTES % (M): 2 % (ref 15–51)
MEAN CORPUSCULAR HEMOGLOBIN: 31.2 PG (ref 29–33)
MEAN CORPUSCULAR HGB CONC: 33.7 G/DL (ref 32–37)
MEAN CORPUSCULAR VOLUME: 92.4 FL (ref 82–101)
MEAN PLATELET VOLUME: 10.3 FL (ref 7.4–10.4)
MONOCYTE #M: 0.6 10^3/UL (ref 0.3–0.9)
MONOCYTES % (M): 3 % (ref 0–11)
NUCLEATED RED BLOOD CELLS%: 0 /100WBC (ref 0–0)
PLATELET COUNT: 280 10^3/UL (ref 140–415)
PLATELET ESTIMATE: NORMAL
POSITIVE DIFF: (no result)
POTASSIUM: 3.7 MMOL/L (ref 3.5–5.1)
REACTIVE LYMPHOCYTES #M: 0.6 10^3/UL (ref 0–0)
REACTIVE LYMPHOCYTES% (M): 3 % (ref 0–0)
RED BLOOD COUNT: 2.76 10^6/UL (ref 4.2–5.4)
RED CELL DISTRIBUTION WIDTH: 14.1 % (ref 11.5–14.5)
SEG NEUT #M: 15.9 10^3/UL (ref 1.7–7.5)
SEGMENTED NEUTROPHILS (M) %: 73 % (ref 39–77)
SMUDGE%M: 1 % (ref 0–0)
SODIUM: 140 MMOL/L (ref 135–144)
TROPONIN-I: < 0.012 NG/ML (ref 0–0.12)
WHITE BLOOD COUNT: 20.7 10^3/UL (ref 4.8–10.8)

## 2017-12-25 RX ADMIN — MEROPENEM 1 MLS/HR: 500 INJECTION INTRAVENOUS at 14:56

## 2017-12-25 RX ADMIN — INSULIN ASPART 1 UNIT: 100 INJECTION, SOLUTION INTRAVENOUS; SUBCUTANEOUS at 21:00

## 2017-12-25 RX ADMIN — METHYLPREDNISOLONE SODIUM SUCCINATE 1 MG: 40 INJECTION, POWDER, FOR SOLUTION INTRAMUSCULAR; INTRAVENOUS at 00:24

## 2017-12-25 RX ADMIN — PYRIDOXINE HYDROCHLORIDE 1 MLS/HR: 100 INJECTION, SOLUTION INTRAMUSCULAR; INTRAVENOUS at 11:58

## 2017-12-25 RX ADMIN — INSULIN ASPART 1 UNIT: 100 INJECTION, SOLUTION INTRAVENOUS; SUBCUTANEOUS at 11:50

## 2017-12-25 RX ADMIN — METHYLPREDNISOLONE SODIUM SUCCINATE 1 MG: 40 INJECTION, POWDER, FOR SOLUTION INTRAMUSCULAR; INTRAVENOUS at 05:42

## 2017-12-25 RX ADMIN — VANCOMYCIN HYDROCHLORIDE 1 MLS/HR: 1 INJECTION, POWDER, LYOPHILIZED, FOR SOLUTION INTRAVENOUS at 16:58

## 2017-12-25 RX ADMIN — VASOPRESSIN 1: 20 INJECTION, SOLUTION INTRAMUSCULAR; SUBCUTANEOUS at 12:06

## 2017-12-25 RX ADMIN — INSULIN ASPART 1 UNIT: 100 INJECTION, SOLUTION INTRAVENOUS; SUBCUTANEOUS at 17:03

## 2017-12-25 RX ADMIN — CEFEPIME HYDROCHLORIDE 1 MLS/HR: 2 INJECTION, POWDER, FOR SOLUTION INTRAVENOUS at 08:11

## 2017-12-25 RX ADMIN — PYRIDOXINE HYDROCHLORIDE 1 MLS/HR: 100 INJECTION, SOLUTION INTRAMUSCULAR; INTRAVENOUS at 00:24

## 2017-12-25 RX ADMIN — INSULIN GLARGINE 1 UNIT: 100 INJECTION, SOLUTION SUBCUTANEOUS at 21:00

## 2017-12-25 RX ADMIN — INSULIN ASPART 1 UNIT: 100 INJECTION, SOLUTION INTRAVENOUS; SUBCUTANEOUS at 08:14

## 2017-12-25 RX ADMIN — INSULIN ASPART 1 UNIT: 100 INJECTION, SOLUTION INTRAVENOUS; SUBCUTANEOUS at 08:15

## 2017-12-25 RX ADMIN — IODIXANOL 1 ML: 320 INJECTION, SOLUTION INTRAVASCULAR at 12:05

## 2017-12-25 RX ADMIN — METHYLPREDNISOLONE SODIUM SUCCINATE 1 MG: 40 INJECTION, POWDER, FOR SOLUTION INTRAMUSCULAR; INTRAVENOUS at 12:10

## 2017-12-25 RX ADMIN — VANCOMYCIN HYDROCHLORIDE 1 MLS/HR: 1 INJECTION, POWDER, LYOPHILIZED, FOR SOLUTION INTRAVENOUS at 04:51

## 2017-12-25 RX ADMIN — MEROPENEM 1 MLS/HR: 500 INJECTION INTRAVENOUS at 21:02

## 2017-12-25 RX ADMIN — CLOSTRIDIUM TETANI TOXOID ANTIGEN (FORMALDEHYDE INACTIVATED), CORYNEBACTERIUM DIPHTHERIAE TOXOID ANTIGEN (FORMALDEHYDE INACTIVATED), BORDETELLA PERTUSSIS TOXOID ANTIGEN (GLUTARALDEHYDE INACTIVATED), BORDETELLA PERTUSSIS FILAMENTOUS HEMAGGLUTININ ANTIGEN (FORMALDEHYDE INACTIVATED), BORDETELLA PERTUSSIS PERTACTIN ANTIGEN, AND BORDETELLA PERTUSSIS FIMBRIAE 2/3 ANTIGEN 1 ML: 5; 2; 2.5; 5; 3; 5 INJECTION, SUSPENSION INTRAMUSCULAR at 16:59

## 2017-12-25 RX ADMIN — INSULIN ASPART 1 UNIT: 100 INJECTION, SOLUTION INTRAVENOUS; SUBCUTANEOUS at 17:04

## 2017-12-25 RX ADMIN — INSULIN ASPART 1 UNIT: 100 INJECTION, SOLUTION INTRAVENOUS; SUBCUTANEOUS at 12:16

## 2017-12-25 RX ADMIN — CEFEPIME HYDROCHLORIDE 1 MLS/HR: 2 INJECTION, POWDER, FOR SOLUTION INTRAVENOUS at 01:55

## 2017-12-26 LAB
ADD MAN DIFF?: NO
ANION GAP: 10 (ref 8–16)
BASOPHIL #: 0 10^3/UL (ref 0–0.1)
BASOPHILS %: 0.1 % (ref 0–2)
BLOOD UREA NITROGEN: 16 MG/DL (ref 7–20)
CALCIUM: 8.2 MG/DL (ref 8.4–10.2)
CARBON DIOXIDE: 26 MMOL/L (ref 21–31)
CHLORIDE: 110 MMOL/L (ref 97–110)
CREATININE: 0.61 MG/DL (ref 0.44–1)
EOSINOPHILS #: 0 10^3/UL (ref 0–0.5)
EOSINOPHILS %: 0 % (ref 0–7)
GLUCOSE: 128 MG/DL (ref 70–220)
HEMATOCRIT: 22.8 % (ref 37–47)
HEMOGLOBIN: 7.8 G/DL (ref 12–16)
LYMPHOCYTES #: 0.8 10^3/UL (ref 0.8–2.9)
LYMPHOCYTES %: 3.9 % (ref 15–51)
MAGNESIUM: 1.8 MG/DL (ref 1.7–2.5)
MEAN CORPUSCULAR HEMOGLOBIN: 31.6 PG (ref 29–33)
MEAN CORPUSCULAR HGB CONC: 34.2 G/DL (ref 32–37)
MEAN CORPUSCULAR VOLUME: 92.3 FL (ref 82–101)
MEAN PLATELET VOLUME: 10 FL (ref 7.4–10.4)
MONOCYTE #: 1.3 10^3/UL (ref 0.3–0.9)
MONOCYTES %: 5.8 % (ref 0–11)
NEUTROPHIL #: 18.9 10^3/UL (ref 1.6–7.5)
NEUTROPHILS %: 87.6 % (ref 39–77)
NUCLEATED RED BLOOD CELLS #: 0.1 10^3/UL (ref 0–0)
NUCLEATED RED BLOOD CELLS%: 0.2 /100WBC (ref 0–0)
PHOSPHORUS: 3.9 MG/DL (ref 2.5–4.9)
PLATELET COUNT: 320 10^3/UL (ref 140–415)
POTASSIUM: 3.5 MMOL/L (ref 3.5–5.1)
RED BLOOD COUNT: 2.47 10^6/UL (ref 4.2–5.4)
RED CELL DISTRIBUTION WIDTH: 13.9 % (ref 11.5–14.5)
SODIUM: 142 MMOL/L (ref 135–144)
VANCOMYCIN,TROUGH: 5.1 UG/ML (ref 10–20)
WHITE BLOOD COUNT: 21.6 10^3/UL (ref 4.8–10.8)

## 2017-12-26 RX ADMIN — VANCOMYCIN HYDROCHLORIDE 1 MLS/HR: 1 INJECTION, POWDER, LYOPHILIZED, FOR SOLUTION INTRAVENOUS at 05:31

## 2017-12-26 RX ADMIN — MEROPENEM 1 MLS/HR: 500 INJECTION INTRAVENOUS at 14:00

## 2017-12-26 RX ADMIN — IBUPROFEN 1 MG: 600 TABLET ORAL at 21:36

## 2017-12-26 RX ADMIN — Medication 1 APPLIC: at 10:43

## 2017-12-26 RX ADMIN — OXYCODONE HYDROCHLORIDE AND ACETAMINOPHEN 1 TAB: 5; 325 TABLET ORAL at 15:19

## 2017-12-26 RX ADMIN — BARIUM SULFATE 1 ML: 21 SUSPENSION ORAL at 17:32

## 2017-12-26 RX ADMIN — MEROPENEM 1 MLS/HR: 1 INJECTION, POWDER, FOR SOLUTION INTRAVENOUS at 21:30

## 2017-12-26 RX ADMIN — VANCOMYCIN HYDROCHLORIDE 1 MLS/HR: 1 INJECTION, POWDER, LYOPHILIZED, FOR SOLUTION INTRAVENOUS at 12:47

## 2017-12-26 RX ADMIN — INSULIN ASPART 1 UNIT: 100 INJECTION, SOLUTION INTRAVENOUS; SUBCUTANEOUS at 07:55

## 2017-12-26 RX ADMIN — MEROPENEM 1 MLS/HR: 500 INJECTION INTRAVENOUS at 06:39

## 2017-12-27 LAB
ABNORMAL IP MESSAGE: 1
ADD MAN DIFF?: YES
ANION GAP: 11 (ref 8–16)
BAND NEUTROPHILS #M: 1.1 10^3/UL (ref 0–0.6)
BAND NEUTROPHILS % (M): 8 % (ref 0–4)
BASOPHIL #M: 0.1 10^3/UL (ref 0–0)
BASOPHILS % (M): 1 % (ref 0–2)
BLOOD UREA NITROGEN: 10 MG/DL (ref 7–20)
CALCIUM: 7.9 MG/DL (ref 8.4–10.2)
CARBON DIOXIDE: 25 MMOL/L (ref 21–31)
CHLORIDE: 106 MMOL/L (ref 97–110)
CREATININE: 0.53 MG/DL (ref 0.44–1)
EOSINOPHILS % (M): 3 % (ref 0–7)
GIANT THROMBO% (M): 1 % (ref 0–0)
GLUCOSE: 77 MG/DL (ref 70–220)
HEMATOCRIT: 26.6 % (ref 37–47)
HEMOGLOBIN: 8.9 G/DL (ref 12–16)
HYPOCHROMASIA: (no result) (ref 0–0)
LYMPHOCYTES #M: 0.9 10^3/UL (ref 0.8–2.9)
LYMPHOCYTES % (M): 7 % (ref 15–51)
MAGNESIUM: 1.6 MG/DL (ref 1.7–2.5)
MEAN CORPUSCULAR HEMOGLOBIN: 30.7 PG (ref 29–33)
MEAN CORPUSCULAR HGB CONC: 33.5 G/DL (ref 32–37)
MEAN CORPUSCULAR VOLUME: 91.7 FL (ref 82–101)
MEAN PLATELET VOLUME: 9.3 FL (ref 7.4–10.4)
MONOCYTE #M: 0.4 10^3/UL (ref 0.3–0.9)
MONOCYTES % (M): 3 % (ref 0–11)
MYELOCYTES #M: 0.2 10^3/UL (ref 0–0)
MYELOCYTES % (M): 2 % (ref 0–0)
NUCLEATED RED BLOOD CELLS%: 0.4 /100WBC (ref 0–0)
PLATELET COUNT: 400 10^3/UL (ref 140–415)
PLATELET ESTIMATE: NORMAL
POSITIVE DIFF: (no result)
POTASSIUM: 3.4 MMOL/L (ref 3.5–5.1)
RED BLOOD COUNT: 2.9 10^6/UL (ref 4.2–5.4)
RED CELL DISTRIBUTION WIDTH: 14.2 % (ref 11.5–14.5)
SEG NEUT #M: 10.7 10^3/UL (ref 1.7–7.5)
SEGMENTED NEUTROPHILS (M) %: 76 % (ref 39–77)
SMUDGE%M: 1 % (ref 0–0)
SODIUM: 139 MMOL/L (ref 135–144)
WHITE BLOOD COUNT: 13.9 10^3/UL (ref 4.8–10.8)

## 2017-12-27 RX ADMIN — IBUPROFEN 1 MG: 600 TABLET ORAL at 06:08

## 2017-12-27 RX ADMIN — VASOPRESSIN 1 ML/M: 20 INJECTION, SOLUTION INTRAMUSCULAR; SUBCUTANEOUS at 20:20

## 2017-12-27 RX ADMIN — OXYCODONE HYDROCHLORIDE AND ACETAMINOPHEN 1 TAB: 5; 325 TABLET ORAL at 10:36

## 2017-12-27 RX ADMIN — MAGNESIUM SULFATE HEPTAHYDRATE 1 MLS/HR: 40 INJECTION, SOLUTION INTRAVENOUS at 10:36

## 2017-12-27 RX ADMIN — POTASSIUM CHLORIDE 1 MEQ: 1500 TABLET, EXTENDED RELEASE ORAL at 10:36

## 2017-12-27 RX ADMIN — IOHEXOL 1 ML/M: 350 INJECTION, SOLUTION INTRAVENOUS at 20:20

## 2017-12-27 RX ADMIN — MEROPENEM 1 MLS/HR: 1 INJECTION, POWDER, FOR SOLUTION INTRAVENOUS at 05:46

## 2017-12-27 RX ADMIN — MEROPENEM 1 MLS/HR: 1 INJECTION, POWDER, FOR SOLUTION INTRAVENOUS at 14:39

## 2017-12-27 RX ADMIN — MEROPENEM 1 MLS/HR: 1 INJECTION, POWDER, FOR SOLUTION INTRAVENOUS at 20:49

## 2017-12-27 RX ADMIN — OXYCODONE HYDROCHLORIDE AND ACETAMINOPHEN 1 TAB: 5; 325 TABLET ORAL at 18:11

## 2017-12-27 RX ADMIN — ACETAMINOPHEN 1 MG: 500 TABLET, FILM COATED ORAL at 23:45

## 2017-12-27 RX ADMIN — SODIUM CHLORIDE 1 ML: 9 INJECTION, SOLUTION INTRAMUSCULAR; INTRAVENOUS; SUBCUTANEOUS at 20:17

## 2017-12-28 LAB
ABNORMAL IP MESSAGE: 1
ADD MAN DIFF?: NO
ADD MAN DIFF?: NO
ANION GAP: 12 (ref 8–16)
BASOPHIL #: 0.1 10^3/UL (ref 0–0.1)
BASOPHIL #: 0.1 10^3/UL (ref 0–0.1)
BASOPHILS %: 0.2 % (ref 0–2)
BASOPHILS %: 0.3 % (ref 0–2)
BLOOD UREA NITROGEN: 9 MG/DL (ref 7–20)
CALCIUM: 8.3 MG/DL (ref 8.4–10.2)
CARBON DIOXIDE: 26 MMOL/L (ref 21–31)
CHLORIDE: 107 MMOL/L (ref 97–110)
CREATININE: 0.53 MG/DL (ref 0.44–1)
EOSINOPHILS #: 1 10^3/UL (ref 0–0.5)
EOSINOPHILS #: 1.2 10^3/UL (ref 0–0.5)
EOSINOPHILS %: 4.5 % (ref 0–7)
EOSINOPHILS %: 5.2 % (ref 0–7)
GLUCOSE: 92 MG/DL (ref 70–220)
HEMATOCRIT: 26.8 % (ref 37–47)
HEMATOCRIT: 28.5 % (ref 37–47)
HEMOGLOBIN: 9.1 G/DL (ref 12–16)
HEMOGLOBIN: 9.4 G/DL (ref 12–16)
INR: 1.08
LYMPHOCYTES #: 1.4 10^3/UL (ref 0.8–2.9)
LYMPHOCYTES #: 1.6 10^3/UL (ref 0.8–2.9)
LYMPHOCYTES %: 6.2 % (ref 15–51)
LYMPHOCYTES %: 6.8 % (ref 15–51)
MAGNESIUM: 1.9 MG/DL (ref 1.7–2.5)
MEAN CORPUSCULAR HEMOGLOBIN: 30.3 PG (ref 29–33)
MEAN CORPUSCULAR HEMOGLOBIN: 30.7 PG (ref 29–33)
MEAN CORPUSCULAR HGB CONC: 33 G/DL (ref 32–37)
MEAN CORPUSCULAR HGB CONC: 34 G/DL (ref 32–37)
MEAN CORPUSCULAR VOLUME: 90.5 FL (ref 82–101)
MEAN CORPUSCULAR VOLUME: 91.9 FL (ref 82–101)
MEAN PLATELET VOLUME: 9 FL (ref 7.4–10.4)
MEAN PLATELET VOLUME: 9 FL (ref 7.4–10.4)
MONOCYTE #: 0.8 10^3/UL (ref 0.3–0.9)
MONOCYTE #: 0.9 10^3/UL (ref 0.3–0.9)
MONOCYTES %: 3.4 % (ref 0–11)
MONOCYTES %: 3.8 % (ref 0–11)
NEUTROPHIL #: 17.9 10^3/UL (ref 1.6–7.5)
NEUTROPHIL #: 18.5 10^3/UL (ref 1.6–7.5)
NEUTROPHILS %: 79.3 % (ref 39–77)
NEUTROPHILS %: 79.4 % (ref 39–77)
NUCLEATED RED BLOOD CELLS #: 0.1 10^3/UL (ref 0–0)
NUCLEATED RED BLOOD CELLS #: 0.1 10^3/UL (ref 0–0)
NUCLEATED RED BLOOD CELLS%: 0.3 /100WBC (ref 0–0)
NUCLEATED RED BLOOD CELLS%: 0.4 /100WBC (ref 0–0)
PARTIAL THROMBOPLASTIN TIME: 37.9 SEC (ref 25–35)
PLATELET COUNT: 438 10^3/UL (ref 140–415)
PLATELET COUNT: 481 10^3/UL (ref 140–415)
POSITIVE DIFF: (no result)
POTASSIUM: 4.1 MMOL/L (ref 3.5–5.1)
PROTIME: 14.1 SEC (ref 11.9–14.9)
PT RATIO: 1.1
RED BLOOD COUNT: 2.96 10^6/UL (ref 4.2–5.4)
RED BLOOD COUNT: 3.1 10^6/UL (ref 4.2–5.4)
RED CELL DISTRIBUTION WIDTH: 14 % (ref 11.5–14.5)
RED CELL DISTRIBUTION WIDTH: 14.2 % (ref 11.5–14.5)
SODIUM: 141 MMOL/L (ref 135–144)
WHITE BLOOD COUNT: 22.6 10^3/UL (ref 4.8–10.8)
WHITE BLOOD COUNT: 23.3 10^3/UL (ref 4.8–10.8)

## 2017-12-28 RX ADMIN — LIDOCAINE HYDROCHLORIDE 1 ML: 10 INJECTION, SOLUTION INFILTRATION; PERINEURAL at 14:30

## 2017-12-28 RX ADMIN — LIDOCAINE HYDROCHLORIDE 1 ML: 10 INJECTION, SOLUTION INFILTRATION; PERINEURAL at 12:44

## 2017-12-28 RX ADMIN — OXYCODONE HYDROCHLORIDE AND ACETAMINOPHEN 1 TAB: 5; 325 TABLET ORAL at 03:50

## 2017-12-28 RX ADMIN — OXYCODONE HYDROCHLORIDE AND ACETAMINOPHEN 1 TAB: 5; 325 TABLET ORAL at 07:56

## 2017-12-28 RX ADMIN — CEFAZOLIN 1: 1 INJECTION, POWDER, FOR SOLUTION INTRAMUSCULAR; INTRAVENOUS at 12:44

## 2017-12-28 RX ADMIN — VANCOMYCIN HYDROCHLORIDE 1 MLS/HR: 1 INJECTION, POWDER, LYOPHILIZED, FOR SOLUTION INTRAVENOUS at 22:00

## 2017-12-28 RX ADMIN — FENTANYL CITRATE 1 MCG: 50 INJECTION, SOLUTION INTRAMUSCULAR; INTRAVENOUS at 12:44

## 2017-12-28 RX ADMIN — MIDAZOLAM HYDROCHLORIDE 1 MG: 2 INJECTION, SOLUTION INTRAMUSCULAR; INTRAVENOUS at 12:44

## 2017-12-28 RX ADMIN — MEROPENEM 1 MLS/HR: 1 INJECTION, POWDER, FOR SOLUTION INTRAVENOUS at 15:32

## 2017-12-28 RX ADMIN — OXYCODONE HYDROCHLORIDE AND ACETAMINOPHEN 1 TAB: 5; 325 TABLET ORAL at 18:20

## 2017-12-28 RX ADMIN — LIDOCAINE HYDROCHLORIDE 1: 10 INJECTION, SOLUTION EPIDURAL; INFILTRATION; INTRACAUDAL; PERINEURAL at 12:44

## 2017-12-28 RX ADMIN — MEROPENEM 1 MLS/HR: 1 INJECTION, POWDER, FOR SOLUTION INTRAVENOUS at 05:24

## 2017-12-28 RX ADMIN — MEROPENEM 1 MLS/HR: 1 INJECTION, POWDER, FOR SOLUTION INTRAVENOUS at 20:39

## 2017-12-29 LAB
ABNORMAL IP MESSAGE: 1
ADD MAN DIFF?: NO
ANION GAP: 15 (ref 8–16)
BASOPHIL #: 0.1 10^3/UL (ref 0–0.1)
BASOPHILS %: 0.3 % (ref 0–2)
BLOOD UREA NITROGEN: 8 MG/DL (ref 7–20)
CALCIUM: 8.4 MG/DL (ref 8.4–10.2)
CARBON DIOXIDE: 26 MMOL/L (ref 21–31)
CHLORIDE: 104 MMOL/L (ref 97–110)
CREATININE: 0.55 MG/DL (ref 0.44–1)
EOSINOPHILS #: 1.4 10^3/UL (ref 0–0.5)
EOSINOPHILS %: 7.8 % (ref 0–7)
GLUCOSE: 84 MG/DL (ref 70–220)
HEMATOCRIT: 25.5 % (ref 37–47)
HEMOGLOBIN: 8.3 G/DL (ref 12–16)
LYMPHOCYTES #: 1.7 10^3/UL (ref 0.8–2.9)
LYMPHOCYTES %: 9.4 % (ref 15–51)
MAGNESIUM: 1.9 MG/DL (ref 1.7–2.5)
MEAN CORPUSCULAR HEMOGLOBIN: 30.6 PG (ref 29–33)
MEAN CORPUSCULAR HGB CONC: 32.5 G/DL (ref 32–37)
MEAN CORPUSCULAR VOLUME: 94.1 FL (ref 82–101)
MEAN PLATELET VOLUME: 8.8 FL (ref 7.4–10.4)
MONOCYTE #: 0.6 10^3/UL (ref 0.3–0.9)
MONOCYTES %: 3.4 % (ref 0–11)
NEUTROPHIL #: 13.3 10^3/UL (ref 1.6–7.5)
NEUTROPHILS %: 73.1 % (ref 39–77)
NUCLEATED RED BLOOD CELLS #: 0 10^3/UL (ref 0–0)
NUCLEATED RED BLOOD CELLS%: 0.1 /100WBC (ref 0–0)
PHOSPHORUS: 3.8 MG/DL (ref 2.5–4.9)
PLATELET COUNT: 433 10^3/UL (ref 140–415)
POSITIVE DIFF: (no result)
POTASSIUM: 4.4 MMOL/L (ref 3.5–5.1)
RED BLOOD COUNT: 2.71 10^6/UL (ref 4.2–5.4)
RED CELL DISTRIBUTION WIDTH: 13.8 % (ref 11.5–14.5)
SODIUM: 141 MMOL/L (ref 135–144)
VANCOMYCIN,TROUGH: 19.2 UG/ML (ref 10–20)
WHITE BLOOD COUNT: 18.2 10^3/UL (ref 4.8–10.8)

## 2017-12-29 RX ADMIN — MEROPENEM 1 MLS/HR: 1 INJECTION, POWDER, FOR SOLUTION INTRAVENOUS at 21:27

## 2017-12-29 RX ADMIN — MORPHINE SULFATE 1 MG: 2 INJECTION, SOLUTION INTRAMUSCULAR; INTRAVENOUS at 03:01

## 2017-12-29 RX ADMIN — VANCOMYCIN HYDROCHLORIDE 1 MLS/HR: 1 INJECTION, POWDER, LYOPHILIZED, FOR SOLUTION INTRAVENOUS at 21:27

## 2017-12-29 RX ADMIN — VANCOMYCIN HYDROCHLORIDE 1 MLS/HR: 1 INJECTION, POWDER, LYOPHILIZED, FOR SOLUTION INTRAVENOUS at 15:00

## 2017-12-29 RX ADMIN — IBUPROFEN 1 MG: 600 TABLET ORAL at 21:35

## 2017-12-29 RX ADMIN — MEROPENEM 1 MLS/HR: 1 INJECTION, POWDER, FOR SOLUTION INTRAVENOUS at 14:43

## 2017-12-29 RX ADMIN — OXYCODONE HYDROCHLORIDE AND ACETAMINOPHEN 1 TAB: 5; 325 TABLET ORAL at 20:55

## 2017-12-29 RX ADMIN — OXYCODONE HYDROCHLORIDE AND ACETAMINOPHEN 1 TAB: 5; 325 TABLET ORAL at 00:25

## 2017-12-29 RX ADMIN — OXYCODONE HYDROCHLORIDE AND ACETAMINOPHEN 1 TAB: 5; 325 TABLET ORAL at 07:21

## 2017-12-29 RX ADMIN — MEROPENEM 1 MLS/HR: 1 INJECTION, POWDER, FOR SOLUTION INTRAVENOUS at 05:00

## 2017-12-29 RX ADMIN — VANCOMYCIN HYDROCHLORIDE 1 MLS/HR: 1 INJECTION, POWDER, LYOPHILIZED, FOR SOLUTION INTRAVENOUS at 05:30

## 2017-12-29 RX ADMIN — ACETAMINOPHEN 1 MG: 325 TABLET, FILM COATED ORAL at 11:56

## 2017-12-29 RX ADMIN — OXYCODONE HYDROCHLORIDE AND ACETAMINOPHEN 1 TAB: 5; 325 TABLET ORAL at 14:57

## 2017-12-30 LAB
ABNORMAL IP MESSAGE: 1
ADD MAN DIFF?: NO
ALANINE AMINOTRANSFERASE: 31 IU/L (ref 13–69)
ALBUMIN/GLOBULIN RATIO: 0.8
ALBUMIN: 2.9 G/DL (ref 3.3–4.9)
ALKALINE PHOSPHATASE: 152 IU/L (ref 42–121)
ANION GAP: 15 (ref 8–16)
ASPARTATE AMINO TRANSFERASE: 23 IU/L (ref 15–46)
BASOPHIL #: 0.1 10^3/UL (ref 0–0.1)
BASOPHILS %: 0.4 % (ref 0–2)
BILIRUBIN,DIRECT: 0 MG/DL (ref 0–0.2)
BILIRUBIN,TOTAL: 0.1 MG/DL (ref 0.2–1.3)
BLOOD UREA NITROGEN: 8 MG/DL (ref 7–20)
CALCIUM: 8.6 MG/DL (ref 8.4–10.2)
CARBON DIOXIDE: 27 MMOL/L (ref 21–31)
CHLORIDE: 103 MMOL/L (ref 97–110)
CHOL/HDL RATIO: 5.9 RATIO
CHOLESTEROL: 154 MG/DL (ref 100–200)
CREATININE: 0.56 MG/DL (ref 0.44–1)
EOSINOPHILS #: 1.2 10^3/UL (ref 0–0.5)
EOSINOPHILS % (M): 9 % (ref 0–7)
EOSINOPHILS %: 9 % (ref 0–7)
FREE T4 (FREE THYROXINE): 1.49 NG/DL (ref 0.79–2.35)
GIANT THROMBO% (M): 1 % (ref 0–0)
GLOBULIN: 3.6 G/DL (ref 1.3–3.2)
GLUCOSE: 94 MG/DL (ref 70–220)
HDL CHOLESTEROL: 26 MG/DL (ref 34–82)
HEMATOCRIT: 28.1 % (ref 37–47)
HEMOGLOBIN: 9.2 G/DL (ref 12–16)
LDL CHOLESTEROL,CALCULATED: 66 MG/DL
LYMPHOCYTES #: 1.7 10^3/UL (ref 0.8–2.9)
LYMPHOCYTES #M: 1.5 10^3/UL (ref 0.8–2.9)
LYMPHOCYTES % (M): 12 % (ref 15–51)
LYMPHOCYTES %: 13.1 % (ref 15–51)
MAGNESIUM: 1.8 MG/DL (ref 1.7–2.5)
MEAN CORPUSCULAR HEMOGLOBIN: 30.9 PG (ref 29–33)
MEAN CORPUSCULAR HGB CONC: 32.7 G/DL (ref 32–37)
MEAN CORPUSCULAR VOLUME: 94.3 FL (ref 82–101)
MEAN PLATELET VOLUME: 8.7 FL (ref 7.4–10.4)
MONOCYTE #: 0.4 10^3/UL (ref 0.3–0.9)
MONOCYTE #M: 0.7 10^3/UL (ref 0.3–0.9)
MONOCYTES % (M): 6 % (ref 0–11)
MONOCYTES %: 3.2 % (ref 0–11)
MYELOCYTES #M: 0.1 10^3/UL (ref 0–0)
MYELOCYTES % (M): 1 % (ref 0–0)
NEUTROPHIL #: 9.1 10^3/UL (ref 1.6–7.5)
NEUTROPHILS %: 68.1 % (ref 39–77)
NUCLEATED RED BLOOD CELLS #: 0 10^3/UL (ref 0–0)
NUCLEATED RED BLOOD CELLS%: 0 /100WBC (ref 0–0)
PHOSPHORUS: 4.2 MG/DL (ref 2.5–4.9)
PLASMA CELLS #M: 0.1 10^3/UL (ref 0–0)
PLASMAC%(M): 1 %
PLATELET COUNT: 527 10^3/UL (ref 140–415)
PLATELET ESTIMATE: (no result)
POLYCHROMASIA: (no result) (ref 0–0)
POSITIVE DIFF: (no result)
POTASSIUM: 3.9 MMOL/L (ref 3.5–5.1)
RED BLOOD COUNT: 2.98 10^6/UL (ref 4.2–5.4)
RED CELL DISTRIBUTION WIDTH: 13.4 % (ref 11.5–14.5)
SEGMENTED NEUTROPHILS (M) %: 71 % (ref 39–77)
SMUDGE%M: 2 % (ref 0–0)
SODIUM: 141 MMOL/L (ref 135–144)
THYROID STIMULATING HORMONE: 1.88 MIU/L (ref 0.47–4.68)
TOTAL PROTEIN: 6.5 G/DL (ref 6.1–8.1)
TRIGLYCERIDES: 311 MG/DL (ref 0–149)
WHITE BLOOD COUNT: 13.3 10^3/UL (ref 4.8–10.8)

## 2017-12-30 RX ADMIN — OXYCODONE HYDROCHLORIDE AND ACETAMINOPHEN 1 TAB: 5; 325 TABLET ORAL at 00:11

## 2017-12-30 RX ADMIN — OXYCODONE HYDROCHLORIDE AND ACETAMINOPHEN 1 TAB: 5; 325 TABLET ORAL at 12:22

## 2017-12-30 RX ADMIN — VANCOMYCIN HYDROCHLORIDE 1 MLS/HR: 1 INJECTION, POWDER, LYOPHILIZED, FOR SOLUTION INTRAVENOUS at 16:41

## 2017-12-30 RX ADMIN — MEROPENEM 1 MLS/HR: 1 INJECTION, POWDER, FOR SOLUTION INTRAVENOUS at 05:24

## 2017-12-30 RX ADMIN — OXYCODONE HYDROCHLORIDE AND ACETAMINOPHEN 1 TAB: 5; 325 TABLET ORAL at 23:14

## 2017-12-30 RX ADMIN — OXYCODONE HYDROCHLORIDE AND ACETAMINOPHEN 1 TAB: 5; 325 TABLET ORAL at 18:34

## 2017-12-30 RX ADMIN — VANCOMYCIN HYDROCHLORIDE 1 MLS/HR: 1 INJECTION, POWDER, LYOPHILIZED, FOR SOLUTION INTRAVENOUS at 08:04

## 2017-12-30 RX ADMIN — MEROPENEM 1 MLS/HR: 1 INJECTION, POWDER, FOR SOLUTION INTRAVENOUS at 14:34

## 2017-12-30 RX ADMIN — MEROPENEM 1 MLS/HR: 1 INJECTION, POWDER, FOR SOLUTION INTRAVENOUS at 23:14

## 2017-12-30 RX ADMIN — OXYCODONE HYDROCHLORIDE AND ACETAMINOPHEN 1 TAB: 5; 325 TABLET ORAL at 08:06

## 2017-12-31 LAB
ABNORMAL IP MESSAGE: 1
ADD MAN DIFF?: YES
ANION GAP: 12 (ref 8–16)
ANISOCYTOSIS: (no result) (ref 0–0)
BAND NEUTROPHILS #M: 0.5 10^3/UL (ref 0–0.6)
BAND NEUTROPHILS % (M): 4 % (ref 0–4)
BLOOD UREA NITROGEN: 11 MG/DL (ref 7–20)
CALCIUM: 8.7 MG/DL (ref 8.4–10.2)
CARBON DIOXIDE: 27 MMOL/L (ref 21–31)
CHLORIDE: 104 MMOL/L (ref 97–110)
CREATININE: 0.59 MG/DL (ref 0.44–1)
EOSINOPHILS % (M): 7 % (ref 0–7)
GLUCOSE: 87 MG/DL (ref 70–220)
HEMATOCRIT: 27.5 % (ref 37–47)
HEMOGLOBIN: 9.1 G/DL (ref 12–16)
LYMPHOCYTES #M: 0.6 10^3/UL (ref 0.8–2.9)
LYMPHOCYTES % (M): 5 % (ref 15–51)
MAGNESIUM: 1.8 MG/DL (ref 1.7–2.5)
MEAN CORPUSCULAR HEMOGLOBIN: 30.7 PG (ref 29–33)
MEAN CORPUSCULAR HGB CONC: 33.1 G/DL (ref 32–37)
MEAN CORPUSCULAR VOLUME: 92.9 FL (ref 82–101)
MEAN PLATELET VOLUME: 8.7 FL (ref 7.4–10.4)
MONOCYTE #M: 0.9 10^3/UL (ref 0.3–0.9)
MONOCYTES % (M): 7 % (ref 0–11)
MYELOCYTES #M: 0.4 10^3/UL (ref 0–0)
MYELOCYTES % (M): 3 % (ref 0–0)
NUCLEATED RED BLOOD CELLS%: 0.1 /100WBC (ref 0–0)
PHOSPHORUS: 4.9 MG/DL (ref 2.5–4.9)
PLATELET COUNT: 564 10^3/UL (ref 140–415)
PLATELET ESTIMATE: (no result)
POLYCHROMASIA: (no result) (ref 0–0)
POSITIVE DIFF: (no result)
POTASSIUM: 4 MMOL/L (ref 3.5–5.1)
REACTIVE LYMPHOCYTES #M: 0.1 10^3/UL (ref 0–0)
REACTIVE LYMPHOCYTES% (M): 1 % (ref 0–0)
RED BLOOD COUNT: 2.96 10^6/UL (ref 4.2–5.4)
RED CELL DISTRIBUTION WIDTH: 13.7 % (ref 11.5–14.5)
SEG NEUT #M: 10 10^3/UL (ref 1.7–7.5)
SEGMENTED NEUTROPHILS (M) %: 73 % (ref 39–77)
SMUDGE%M: 2 % (ref 0–0)
SODIUM: 139 MMOL/L (ref 135–144)
VANCOMYCIN,TROUGH: 18.2 UG/ML (ref 10–20)
WHITE BLOOD COUNT: 13.6 10^3/UL (ref 4.8–10.8)

## 2017-12-31 RX ADMIN — MEROPENEM 1 MLS/HR: 1 INJECTION, POWDER, FOR SOLUTION INTRAVENOUS at 15:44

## 2017-12-31 RX ADMIN — VANCOMYCIN HYDROCHLORIDE 1 MLS/HR: 500 INJECTION, POWDER, LYOPHILIZED, FOR SOLUTION INTRAVENOUS at 17:56

## 2017-12-31 RX ADMIN — MEROPENEM 1 MLS/HR: 1 INJECTION, POWDER, FOR SOLUTION INTRAVENOUS at 21:31

## 2017-12-31 RX ADMIN — IBUPROFEN 1 MG: 600 TABLET ORAL at 05:25

## 2017-12-31 RX ADMIN — MEROPENEM 1 MLS/HR: 1 INJECTION, POWDER, FOR SOLUTION INTRAVENOUS at 05:25

## 2017-12-31 RX ADMIN — OXYCODONE HYDROCHLORIDE AND ACETAMINOPHEN 1 TAB: 5; 325 TABLET ORAL at 13:38

## 2017-12-31 RX ADMIN — OXYCODONE HYDROCHLORIDE AND ACETAMINOPHEN 1 TAB: 5; 325 TABLET ORAL at 04:01

## 2017-12-31 RX ADMIN — VANCOMYCIN HYDROCHLORIDE 1 MLS/HR: 1 INJECTION, POWDER, LYOPHILIZED, FOR SOLUTION INTRAVENOUS at 02:30

## 2017-12-31 RX ADMIN — OXYCODONE HYDROCHLORIDE AND ACETAMINOPHEN 1 TAB: 5; 325 TABLET ORAL at 19:54

## 2017-12-31 RX ADMIN — VANCOMYCIN HYDROCHLORIDE 1 MLS/HR: 1 INJECTION, POWDER, LYOPHILIZED, FOR SOLUTION INTRAVENOUS at 02:09

## 2018-01-01 LAB
ADD MAN DIFF?: NO
ANION GAP: 13 (ref 8–16)
BASOPHIL #: 0.1 10^3/UL (ref 0–0.1)
BASOPHILS %: 0.4 % (ref 0–2)
BLOOD UREA NITROGEN: 12 MG/DL (ref 7–20)
CALCIUM: 9 MG/DL (ref 8.4–10.2)
CARBON DIOXIDE: 25 MMOL/L (ref 21–31)
CHLORIDE: 105 MMOL/L (ref 97–110)
CREATININE: 0.57 MG/DL (ref 0.44–1)
EOSINOPHILS #: 0.8 10^3/UL (ref 0–0.5)
EOSINOPHILS %: 6.2 % (ref 0–7)
GLUCOSE: 103 MG/DL (ref 70–220)
HEMATOCRIT: 30.1 % (ref 37–47)
HEMOGLOBIN: 9.7 G/DL (ref 12–16)
LYMPHOCYTES #: 1.7 10^3/UL (ref 0.8–2.9)
LYMPHOCYTES %: 12.5 % (ref 15–51)
MAGNESIUM: 1.9 MG/DL (ref 1.7–2.5)
MEAN CORPUSCULAR HEMOGLOBIN: 29.8 PG (ref 29–33)
MEAN CORPUSCULAR HGB CONC: 32.2 G/DL (ref 32–37)
MEAN CORPUSCULAR VOLUME: 92.6 FL (ref 82–101)
MEAN PLATELET VOLUME: 8.6 FL (ref 7.4–10.4)
MONOCYTE #: 0.8 10^3/UL (ref 0.3–0.9)
MONOCYTES %: 5.6 % (ref 0–11)
NEUTROPHIL #: 9.6 10^3/UL (ref 1.6–7.5)
NEUTROPHILS %: 71.1 % (ref 39–77)
NUCLEATED RED BLOOD CELLS #: 0 10^3/UL (ref 0–0)
NUCLEATED RED BLOOD CELLS%: 0 /100WBC (ref 0–0)
PHOSPHORUS: 4.6 MG/DL (ref 2.5–4.9)
PLATELET COUNT: 582 10^3/UL (ref 140–415)
POTASSIUM: 4.2 MMOL/L (ref 3.5–5.1)
RED BLOOD COUNT: 3.25 10^6/UL (ref 4.2–5.4)
RED CELL DISTRIBUTION WIDTH: 13.2 % (ref 11.5–14.5)
SODIUM: 139 MMOL/L (ref 135–144)
WHITE BLOOD COUNT: 13.5 10^3/UL (ref 4.8–10.8)

## 2018-01-01 RX ADMIN — VANCOMYCIN HYDROCHLORIDE 1 MLS/HR: 500 INJECTION, POWDER, LYOPHILIZED, FOR SOLUTION INTRAVENOUS at 14:13

## 2018-01-01 RX ADMIN — OXYCODONE HYDROCHLORIDE AND ACETAMINOPHEN 1 TAB: 5; 325 TABLET ORAL at 13:58

## 2018-01-01 RX ADMIN — OXYCODONE HYDROCHLORIDE AND ACETAMINOPHEN 1 TAB: 5; 325 TABLET ORAL at 20:46

## 2018-01-01 RX ADMIN — VANCOMYCIN HYDROCHLORIDE 1 MLS/HR: 500 INJECTION, POWDER, LYOPHILIZED, FOR SOLUTION INTRAVENOUS at 19:45

## 2018-01-01 RX ADMIN — OXYCODONE HYDROCHLORIDE AND ACETAMINOPHEN 1 TAB: 5; 325 TABLET ORAL at 06:37

## 2018-01-01 RX ADMIN — OXYCODONE HYDROCHLORIDE AND ACETAMINOPHEN 1 TAB: 5; 325 TABLET ORAL at 01:16

## 2018-01-01 RX ADMIN — MEROPENEM 1 MLS/HR: 1 INJECTION, POWDER, FOR SOLUTION INTRAVENOUS at 06:30

## 2018-01-01 RX ADMIN — MEROPENEM 1 MLS/HR: 1 INJECTION, POWDER, FOR SOLUTION INTRAVENOUS at 22:16

## 2018-01-01 RX ADMIN — OXYCODONE HYDROCHLORIDE AND ACETAMINOPHEN 1 TAB: 5; 325 TABLET ORAL at 19:44

## 2018-01-01 RX ADMIN — VANCOMYCIN HYDROCHLORIDE 1 MLS/HR: 500 INJECTION, POWDER, LYOPHILIZED, FOR SOLUTION INTRAVENOUS at 04:23

## 2018-01-01 RX ADMIN — MEROPENEM 1 MLS/HR: 1 INJECTION, POWDER, FOR SOLUTION INTRAVENOUS at 17:29

## 2018-01-02 LAB
ADD MAN DIFF?: NO
BASOPHIL #: 0 10^3/UL (ref 0–0.1)
BASOPHILS %: 0.2 % (ref 0–2)
EOSINOPHILS #: 0.6 10^3/UL (ref 0–0.5)
EOSINOPHILS %: 4.2 % (ref 0–7)
HEMATOCRIT: 31.4 % (ref 37–47)
HEMOGLOBIN: 10.3 G/DL (ref 12–16)
LYMPHOCYTES #: 1.6 10^3/UL (ref 0.8–2.9)
LYMPHOCYTES %: 12.2 % (ref 15–51)
MEAN CORPUSCULAR HEMOGLOBIN: 30.3 PG (ref 29–33)
MEAN CORPUSCULAR HGB CONC: 32.8 G/DL (ref 32–37)
MEAN CORPUSCULAR VOLUME: 92.4 FL (ref 82–101)
MEAN PLATELET VOLUME: 8.7 FL (ref 7.4–10.4)
MONOCYTE #: 0.6 10^3/UL (ref 0.3–0.9)
MONOCYTES %: 4.6 % (ref 0–11)
NEUTROPHIL #: 10.3 10^3/UL (ref 1.6–7.5)
NEUTROPHILS %: 76.7 % (ref 39–77)
NUCLEATED RED BLOOD CELLS #: 0 10^3/UL (ref 0–0)
NUCLEATED RED BLOOD CELLS%: 0 /100WBC (ref 0–0)
PLATELET COUNT: 621 10^3/UL (ref 140–415)
RED BLOOD COUNT: 3.4 10^6/UL (ref 4.2–5.4)
RED CELL DISTRIBUTION WIDTH: 13.4 % (ref 11.5–14.5)
VANCOMYCIN,TROUGH: 10.6 UG/ML (ref 10–20)
WHITE BLOOD COUNT: 13.4 10^3/UL (ref 4.8–10.8)

## 2018-01-02 RX ADMIN — MEROPENEM 1 MLS/HR: 1 INJECTION, POWDER, FOR SOLUTION INTRAVENOUS at 06:57

## 2018-01-02 RX ADMIN — OXYCODONE HYDROCHLORIDE AND ACETAMINOPHEN 1 TAB: 5; 325 TABLET ORAL at 00:52

## 2018-01-02 RX ADMIN — MEROPENEM 1 MLS/HR: 1 INJECTION, POWDER, FOR SOLUTION INTRAVENOUS at 15:56

## 2018-01-02 RX ADMIN — OXYCODONE HYDROCHLORIDE AND ACETAMINOPHEN 1 TAB: 5; 325 TABLET ORAL at 22:51

## 2018-01-02 RX ADMIN — VANCOMYCIN HYDROCHLORIDE 1 MLS/HR: 500 INJECTION, POWDER, LYOPHILIZED, FOR SOLUTION INTRAVENOUS at 13:36

## 2018-01-02 RX ADMIN — OXYCODONE HYDROCHLORIDE AND ACETAMINOPHEN 1 TAB: 5; 325 TABLET ORAL at 14:23

## 2018-01-02 RX ADMIN — MEROPENEM 1 MLS/HR: 1 INJECTION, POWDER, FOR SOLUTION INTRAVENOUS at 21:05

## 2018-01-02 RX ADMIN — VANCOMYCIN HYDROCHLORIDE 1 MLS/HR: 500 INJECTION, POWDER, LYOPHILIZED, FOR SOLUTION INTRAVENOUS at 04:57

## 2018-01-02 RX ADMIN — OXYCODONE HYDROCHLORIDE AND ACETAMINOPHEN 1 TAB: 5; 325 TABLET ORAL at 06:56

## 2018-01-03 LAB
ADD MAN DIFF?: NO
BASOPHIL #: 0.1 10^3/UL (ref 0–0.1)
BASOPHILS %: 0.5 % (ref 0–2)
EOSINOPHILS #: 0.6 10^3/UL (ref 0–0.5)
EOSINOPHILS %: 6.3 % (ref 0–7)
HEMATOCRIT: 29.2 % (ref 37–47)
HEMOGLOBIN: 9.7 G/DL (ref 12–16)
LYMPHOCYTES #: 1.8 10^3/UL (ref 0.8–2.9)
LYMPHOCYTES %: 18.2 % (ref 15–51)
MEAN CORPUSCULAR HEMOGLOBIN: 30.5 PG (ref 29–33)
MEAN CORPUSCULAR HGB CONC: 33.2 G/DL (ref 32–37)
MEAN CORPUSCULAR VOLUME: 91.8 FL (ref 82–101)
MEAN PLATELET VOLUME: 8.8 FL (ref 7.4–10.4)
MONOCYTE #: 0.8 10^3/UL (ref 0.3–0.9)
MONOCYTES %: 7.9 % (ref 0–11)
NEUTROPHIL #: 6.4 10^3/UL (ref 1.6–7.5)
NEUTROPHILS %: 65.6 % (ref 39–77)
NUCLEATED RED BLOOD CELLS #: 0 10^3/UL (ref 0–0)
NUCLEATED RED BLOOD CELLS%: 0 /100WBC (ref 0–0)
PLATELET COUNT: 562 10^3/UL (ref 140–415)
RED BLOOD COUNT: 3.18 10^6/UL (ref 4.2–5.4)
RED CELL DISTRIBUTION WIDTH: 13.3 % (ref 11.5–14.5)
WHITE BLOOD COUNT: 9.8 10^3/UL (ref 4.8–10.8)

## 2018-01-03 RX ADMIN — VANCOMYCIN HYDROCHLORIDE 1 MLS/HR: 500 INJECTION, POWDER, LYOPHILIZED, FOR SOLUTION INTRAVENOUS at 00:21

## 2018-01-03 RX ADMIN — MEROPENEM 1 MLS/HR: 1 INJECTION, POWDER, FOR SOLUTION INTRAVENOUS at 05:21

## 2018-01-03 RX ADMIN — IBUPROFEN 1 MG: 600 TABLET ORAL at 01:55

## 2018-01-03 RX ADMIN — VANCOMYCIN HYDROCHLORIDE 1 MLS/HR: 500 INJECTION, POWDER, LYOPHILIZED, FOR SOLUTION INTRAVENOUS at 08:15

## 2018-01-03 RX ADMIN — MEROPENEM 1 MLS/HR: 1 INJECTION, POWDER, FOR SOLUTION INTRAVENOUS at 14:00
